# Patient Record
Sex: FEMALE | Race: WHITE | Employment: FULL TIME | URBAN - METROPOLITAN AREA
[De-identification: names, ages, dates, MRNs, and addresses within clinical notes are randomized per-mention and may not be internally consistent; named-entity substitution may affect disease eponyms.]

---

## 2017-01-09 ENCOUNTER — HOSPITAL ENCOUNTER (OUTPATIENT)
Dept: ULTRASOUND IMAGING | Age: 57
Discharge: HOME | End: 2017-01-09
Attending: FAMILY MEDICINE
Payer: COMMERCIAL

## 2017-01-09 DIAGNOSIS — R74.8 ELEVATED LIVER ENZYMES: ICD-10-CM

## 2017-01-09 PROCEDURE — 76700 US EXAM ABDOM COMPLETE: CPT

## 2017-01-24 ENCOUNTER — HOSPITAL ENCOUNTER (OUTPATIENT)
Dept: MAMMOGRAPHY | Age: 57
Discharge: HOME | End: 2017-01-24
Attending: FAMILY MEDICINE
Payer: COMMERCIAL

## 2017-01-24 DIAGNOSIS — Z12.31 VISIT FOR SCREENING MAMMOGRAM: ICD-10-CM

## 2017-01-24 PROCEDURE — 77067 SCR MAMMO BI INCL CAD: CPT

## 2018-02-19 ENCOUNTER — HOSPITAL ENCOUNTER (OUTPATIENT)
Dept: MAMMOGRAPHY | Age: 58
Discharge: HOME | End: 2018-02-19
Attending: FAMILY MEDICINE
Payer: COMMERCIAL

## 2018-02-19 DIAGNOSIS — Z12.31 VISIT FOR SCREENING MAMMOGRAM: ICD-10-CM

## 2018-02-19 PROCEDURE — 77067 SCR MAMMO BI INCL CAD: CPT

## 2018-07-30 ENCOUNTER — OFFICE VISIT (OUTPATIENT)
Dept: SURGERY | Age: 58
End: 2018-07-30

## 2018-07-30 VITALS
TEMPERATURE: 98.6 F | WEIGHT: 153 LBS | OXYGEN SATURATION: 95 % | DIASTOLIC BLOOD PRESSURE: 75 MMHG | HEART RATE: 75 BPM | SYSTOLIC BLOOD PRESSURE: 152 MMHG

## 2018-07-30 DIAGNOSIS — K64.5 THROMBOSED HEMORRHOIDS: Primary | ICD-10-CM

## 2018-07-30 RX ORDER — SERTRALINE HYDROCHLORIDE 100 MG/1
TABLET, FILM COATED ORAL
COMMUNITY
Start: 2018-07-23

## 2018-07-30 NOTE — PROGRESS NOTES
Betito Obando is a 63 y.o. female     Chief Complaint   Patient presents with    Hemorrhoids     Thrombosed hemorrhoids ref by Elan Clark. Visit Vitals    /75 (BP 1 Location: Left arm, BP Patient Position: Sitting)    Pulse 75    Temp 98.6 °F (37 °C) (Oral)    Wt 153 lb (69.4 kg)    SpO2 95%       Health Maintenance Due   Topic Date Due    Hepatitis C Screening  1960    DTaP/Tdap/Td series (1 - Tdap) 02/10/1982    PAP AKA CERVICAL CYTOLOGY  02/10/1982    FOBT Q 1 YEAR AGE 50-75  02/10/2011       1. Have you been to the ER, urgent care clinic since your last visit? Hospitalized since your last visit? No    2. Have you seen or consulted any other health care providers outside of the [facility] since your last visit? Include any pap smears or colon screening.  No

## 2018-07-30 NOTE — MR AVS SNAPSHOT
[address], [address] [facility], [place] [address] [place] [address] 
[phone] Patient: Ana Olea MRN: [id number] AVIS:[id number] Visit Information Date & Time Provider Department Dept. Phone Encounter #  
 7/30/2018  1:20 PM Ghanshyam Wesley MD [facility] [id number] Upcoming Health Maintenance Date Due Hepatitis C Screening 1960 DTaP/Tdap/Td series (1 - Tdap) 2/10/1982 PAP AKA CERVICAL CYTOLOGY 2/10/1982 FOBT Q 1 YEAR AGE 50-75 2/10/2011 Influenza Age 5 to Adult 8/1/2018 BREAST CANCER SCRN MAMMOGRAM 2/19/2020 Allergies as of 7/30/2018  Review Complete On: 7/30/2018 By: Iza Gee No Known Allergies Current Immunizations  Never Reviewed No immunizations on file. Not reviewed this visit Vitals BP Pulse Temp Weight(growth percentile) SpO2 OB Status 152/75 (BP 1 Location: Left arm, BP Patient Position: Sitting) 75 98.6 °F (37 °C) (Oral) 153 lb (69.4 kg) 95% Postmenopausal  
 Smoking Status Never Smoker Preferred Pharmacy Pharmacy Name Phone [facility] [address] [phone] Your Updated Medication List  
  
   
This list is accurate as of 7/30/18  2:11 PM.  Always use your most recent med list.  
  
  
  
  
 sertraline 100 mg tablet Commonly known as:  [facility] Introducing \[facility] [place] [facility]\"" & HEALTH SERVICES! [employer] introduces Pocket Video patient portal. Now you can access parts of your medical record, email your doctor's office, and request medication refills online. 1. In your internet browser, go to [email or web address] 2. Click on the First Time User? Click Here link in the Sign In box. You will see the New Member Sign Up page. 3. Enter your Pocket Video Access Code exactly as it appears below.  You will not need to use this code after youve completed the sign-up process. If you do not sign up before the expiration date, you must request a new code. · Social Touch Access Code: [email or web address] Expires: 10/28/2018  1:30 PM 
 
4. Enter the last four digits of your Social Security Number (xxxx) and Date of Birth (mm/dd/yyyy) as indicated and click Submit. You will be taken to the next sign-up page. 5. Create a Social Touch ID. This will be your Social Touch login ID and cannot be changed, so think of one that is secure and easy to remember. 6. Create a Social Touch password. You can change your password at any time. 7. Enter your Password Reset Question and Answer. This can be used at a later time if you forget your password. 8. Enter your e-mail address. You will receive e-mail notification when new information is available in [address] 9. Click Sign Up. You can now view and download portions of your medical record. 10. Click the Download Summary menu link to download a portable copy of your medical information. If you have questions, please visit the Frequently Asked Questions section of the Social Touch website. Remember, Social Touch is NOT to be used for urgent needs. For medical emergencies, dial 911. Now available from your iPhone and Android! Please provide this summary of care documentation to your next provider. Your primary care clinician is listed as Brennen Macias. If you have any questions after today's visit, please call [phone].

## 2018-07-30 NOTE — PROGRESS NOTES
Surgery Consult: Thrombosed hemorrhoid  Requesting physician:  Yoly Dixon NP    Subjective:   Patient 63 y.o.  female presents for thrombosed hemorrhoid. Patient has had hemorrhoid for over 1 year. It hasn't been bothering her too much other than itching. Denies any pain. However, for the past 1 month, patient noted hardening of the hemorrhoid without pain but with occasional bleeding with BM. No change in bowel habits. No diarrhea or constipation. Had colonoscopy 2 years ago which reportedly was negative. Patient spends lot of time in toilet as she reads. Currently not doing sitz bath. Past Medical & Surgical History:  Past Medical History:   Diagnosis Date    Depression       Past Surgical History:   Procedure Laterality Date    HX BACK SURGERY   & 2007    HX CHOLECYSTECTOMY  2017    HX GYN  1984        HX GYN  1987        HX TUBAL LIGATION         Social History:  Social History     Social History    Marital status:      Spouse name: N/A    Number of children: N/A    Years of education: N/A     Occupational History    Not on file. Social History Main Topics    Smoking status: Never Smoker    Smokeless tobacco: Never Used    Alcohol use Yes      Comment: rarely    Drug use: No    Sexual activity: Not on file     Other Topics Concern    Not on file     Social History Narrative        Family History:  Family History   Problem Relation Age of Onset    Breast Cancer Sister     Lung Cancer Sister     Lung Cancer Brother         Medications:  Current Outpatient Prescriptions   Medication Sig    sertraline (ZOLOFT) 100 mg tablet      No current facility-administered medications for this visit. Allergies:  No Known Allergies    Review of Systems  A comprehensive review of systems was negative except for that written in the HPI.     Objective:     Exam:    Visit Vitals    /75 (BP 1 Location: Left arm, BP Patient Position: Sitting)    Pulse 75    Temp 98.6 °F (37 °C) (Oral)    Wt 69.4 kg (153 lb)    SpO2 95%     General appearance: alert, cooperative, no distress, appears stated age  Eyes: negative  Lungs: clear to auscultation bilaterally  Heart: regular rate and rhythm  Abdomen: soft, non-tender. Non-distended. Rectal:  Right lateral pedunculated 1.5 x 1.5 cm thrombosed hemorrhoid. Non-tender. Right anterior skin tag measuring approximately 0.1 cm. Extremities: extremities normal, atraumatic, no cyanosis or edema. ZACKERY. Skin: Skin color, texture, turgor normal. No rashes or lesions. Neurologic: Grossly normal    Assessment: Thrombosed hemorrhoid    Plan:     Sitz bath  Avoid prolonged sitting in the toilet  Miralax  Possible hemorrhoidectomy vs conservative therapy discussed with the patient at length. Risks, benefit, and alternative to surgery was discussednt. The risks of surgery include but not limited to infection, bleeding, incontinence, stricture, and the risks of general anesthetic. Patient wants to think about it and get back to us. All questions answered.

## 2019-07-17 ENCOUNTER — HOSPITAL ENCOUNTER (OUTPATIENT)
Dept: MAMMOGRAPHY | Age: 59
Discharge: HOME | End: 2019-07-17
Attending: FAMILY MEDICINE
Payer: COMMERCIAL

## 2019-07-17 DIAGNOSIS — Z12.39 SCREENING BREAST EXAMINATION: ICD-10-CM

## 2019-07-17 PROCEDURE — 77067 SCR MAMMO BI INCL CAD: CPT

## 2021-06-11 ENCOUNTER — TRANSCRIBE ORDER (OUTPATIENT)
Dept: SCHEDULING | Age: 61
End: 2021-06-11

## 2021-06-11 DIAGNOSIS — Z12.31 VISIT FOR SCREENING MAMMOGRAM: Primary | ICD-10-CM

## 2021-07-01 ENCOUNTER — HOSPITAL ENCOUNTER (OUTPATIENT)
Dept: MAMMOGRAPHY | Age: 61
Discharge: HOME | End: 2021-07-01
Attending: FAMILY MEDICINE
Payer: COMMERCIAL

## 2021-07-01 DIAGNOSIS — Z12.31 VISIT FOR SCREENING MAMMOGRAM: ICD-10-CM

## 2021-07-01 PROCEDURE — 77067 SCR MAMMO BI INCL CAD: CPT

## 2022-08-25 ENCOUNTER — TRANSCRIBE ORDER (OUTPATIENT)
Dept: SCHEDULING | Age: 62
End: 2022-08-25

## 2022-08-25 DIAGNOSIS — Z13.820 OSTEOPOROSIS SCREENING: ICD-10-CM

## 2022-08-25 DIAGNOSIS — Z78.0 POSTMENOPAUSAL: Primary | ICD-10-CM

## 2022-09-28 ENCOUNTER — TRANSCRIBE ORDER (OUTPATIENT)
Dept: SCHEDULING | Age: 62
End: 2022-09-28

## 2022-09-28 DIAGNOSIS — Z12.31 SCREENING MAMMOGRAM FOR HIGH-RISK PATIENT: Primary | ICD-10-CM

## 2022-11-16 ENCOUNTER — HOSPITAL ENCOUNTER (OUTPATIENT)
Dept: MAMMOGRAPHY | Age: 62
Discharge: HOME | End: 2022-11-16
Attending: FAMILY MEDICINE
Payer: COMMERCIAL

## 2022-11-16 DIAGNOSIS — Z13.820 OSTEOPOROSIS SCREENING: ICD-10-CM

## 2022-11-16 DIAGNOSIS — Z78.0 POSTMENOPAUSAL: ICD-10-CM

## 2022-11-16 DIAGNOSIS — Z12.31 SCREENING MAMMOGRAM FOR HIGH-RISK PATIENT: ICD-10-CM

## 2022-11-16 PROCEDURE — 77063 BREAST TOMOSYNTHESIS BI: CPT

## 2022-11-16 PROCEDURE — 77080 DXA BONE DENSITY AXIAL: CPT

## 2023-05-17 RX ORDER — SERTRALINE HYDROCHLORIDE 100 MG/1
TABLET, FILM COATED ORAL
COMMUNITY
Start: 2018-07-23

## 2024-10-25 ENCOUNTER — HOSPITAL ENCOUNTER (OUTPATIENT)
Age: 64
Discharge: HOME | End: 2024-10-28
Attending: FAMILY MEDICINE
Payer: COMMERCIAL

## 2024-10-25 VITALS — WEIGHT: 200 LBS | BODY MASS INDEX: 36.8 KG/M2 | HEIGHT: 62 IN

## 2024-10-25 DIAGNOSIS — Z12.31 VISIT FOR SCREENING MAMMOGRAM: ICD-10-CM

## 2024-10-25 PROCEDURE — 77063 BREAST TOMOSYNTHESIS BI: CPT

## 2025-08-21 ENCOUNTER — TRANSCRIBE ORDERS (OUTPATIENT)
Facility: HOSPITAL | Age: 65
End: 2025-08-21

## 2025-08-21 DIAGNOSIS — Z78.0 POSTMENOPAUSAL STATE: ICD-10-CM

## 2025-08-21 DIAGNOSIS — Z12.31 ENCOUNTER FOR SCREENING MAMMOGRAM FOR MALIGNANT NEOPLASM OF BREAST: Primary | ICD-10-CM
